# Patient Record
Sex: MALE | Race: BLACK OR AFRICAN AMERICAN | NOT HISPANIC OR LATINO | Employment: UNEMPLOYED | RURAL
[De-identification: names, ages, dates, MRNs, and addresses within clinical notes are randomized per-mention and may not be internally consistent; named-entity substitution may affect disease eponyms.]

---

## 2024-04-24 ENCOUNTER — OFFICE VISIT (OUTPATIENT)
Dept: FAMILY MEDICINE | Facility: CLINIC | Age: 44
End: 2024-04-24
Payer: COMMERCIAL

## 2024-04-24 VITALS
BODY MASS INDEX: 32.6 KG/M2 | SYSTOLIC BLOOD PRESSURE: 134 MMHG | RESPIRATION RATE: 18 BRPM | DIASTOLIC BLOOD PRESSURE: 89 MMHG | OXYGEN SATURATION: 98 % | WEIGHT: 246 LBS | TEMPERATURE: 99 F | HEIGHT: 73 IN | HEART RATE: 89 BPM

## 2024-04-24 DIAGNOSIS — R09.81 NASAL CONGESTION: ICD-10-CM

## 2024-04-24 DIAGNOSIS — J02.9 SORE THROAT: ICD-10-CM

## 2024-04-24 DIAGNOSIS — U07.1 COVID-19: ICD-10-CM

## 2024-04-24 DIAGNOSIS — R05.9 COUGH, UNSPECIFIED TYPE: Primary | ICD-10-CM

## 2024-04-24 LAB
CTP QC/QA: YES
MOLECULAR STREP A: NEGATIVE
POC MOLECULAR INFLUENZA A AGN: NEGATIVE
POC MOLECULAR INFLUENZA B AGN: NEGATIVE
SARS-COV-2 RDRP RESP QL NAA+PROBE: POSITIVE

## 2024-04-24 PROCEDURE — 99203 OFFICE O/P NEW LOW 30 MIN: CPT | Mod: GC,,, | Performed by: SPECIALIST

## 2024-04-24 PROCEDURE — 3075F SYST BP GE 130 - 139MM HG: CPT | Mod: ,,, | Performed by: SPECIALIST

## 2024-04-24 PROCEDURE — 1159F MED LIST DOCD IN RCRD: CPT | Mod: ,,, | Performed by: SPECIALIST

## 2024-04-24 PROCEDURE — 3079F DIAST BP 80-89 MM HG: CPT | Mod: ,,, | Performed by: SPECIALIST

## 2024-04-24 PROCEDURE — 87502 INFLUENZA DNA AMP PROBE: CPT | Mod: QW,,,

## 2024-04-24 PROCEDURE — 87635 SARS-COV-2 COVID-19 AMP PRB: CPT | Mod: QW,GC,, | Performed by: SPECIALIST

## 2024-04-24 PROCEDURE — 3008F BODY MASS INDEX DOCD: CPT | Mod: ,,, | Performed by: SPECIALIST

## 2024-04-24 PROCEDURE — 87651 STREP A DNA AMP PROBE: CPT | Mod: QW,,,

## 2024-04-24 NOTE — LETTER
April 24, 2024      [facility]  [address]  [place] [address]  Phone: [phone]  Fax: [phone]       Patient: Miriam Riley   YOB: 1980  Date of Visit: 04/24/2024    To Whom It May Concern:    Alejandro Riley  was at [facility] on 04/24/2024. The patient may return to work/school on 4/29/24 with no restrictions. If you have any questions or concerns, or if I can be of further assistance, please do not hesitate to contact me.    Sincerely,    Ozzie Inman MD

## 2024-04-24 NOTE — ASSESSMENT & PLAN NOTE
Patient with 4 days history of cough, fever, nasal congestion and body aches. Tested positive for Covid today at the clinic. Out of the window for treatment with Paxlovid. Symptomatic treatment recommended. Return or go to ER if no improvement or if gets worse.     - monitor for symptoms like Fever, chills, worsening of the current symptoms, or persistence of symptoms for more than 5 days.  - watch out for danger signs as inability to swallow, drooling, SOB, Apnea, or chest tightness. If these symptoms develop, go to ER  - Patient was tested for Flu, and Strep A, and swabs were negative.  - patient was counseled to do symptomatic treatment.  - plentiful rest with 5 days off from work  - maintain adequate hydration  - intake of warm/hot fluids  - Tylenol or ibuprofen for fever, pain  - follow up if symptoms fail to improve or worsen

## 2024-04-24 NOTE — PROGRESS NOTES
Subjective:       Patient ID: Miriam Riley is a 43 y.o. male.    Chief Complaint: Cough (Productive cough with yellow mucus since Saturday.), Sore Throat, Nasal Congestion (Sinus congestion and headache since Saturday.), Generalized Body Aches, Fever, and Health Maintenance (Care gaps not addressed, patient ill today.//Shanna Celis CMA)    43 year old male comes to the clinic today for a sick visit. States 4 days history of cough, fever, nasal congestion and body aches. Tested positive for Covid today at the clinic. Out of the window for treatment with Paxlovid. Symptomatic treatment recommended. Return or go to ER if no improvement or if gets worse. Monitor for symptoms like Fever, chills, worsening of the current symptoms, or persistence of symptoms for more than 5 days. Patient was tested for Flu, and Strep A, and swabs were negative. Patient was counseled to do symptomatic treatment, maintain adequate hydration, intake of warm/hot fluids, Tylenol or ibuprofen for fever, pain.        No current outpatient medications on file.    Review of patient's allergies indicates:  No Known Allergies    No past medical history on file.    No past surgical history on file.    No family history on file.    Social History     Tobacco Use    Smoking status: Never    Smokeless tobacco: Never   Substance Use Topics    Alcohol use: Not Currently    Drug use: Never       Review of Systems   Constitutional:  Positive for chills and fever. Negative for appetite change and fatigue.   HENT:  Positive for nasal congestion and sore throat. Negative for ear pain.    Eyes:  Negative for visual disturbance.   Respiratory:  Positive for cough. Negative for chest tightness and shortness of breath.    Cardiovascular:  Negative for chest pain and leg swelling.   Gastrointestinal:  Negative for abdominal pain, diarrhea, nausea and vomiting.   Genitourinary:  Negative for difficulty urinating and flank pain.   Musculoskeletal:  Negative for  "back pain and gait problem.   Integumentary:  Negative for rash and wound.   Neurological:  Negative for dizziness, syncope, light-headedness and headaches.   Psychiatric/Behavioral:  Negative for agitation and confusion.            Objective:      Vitals:    04/24/24 1346   BP: 134/89   BP Location: Left arm   Patient Position: Sitting   BP Method: Large (Automatic)   Pulse: 89   Resp: 18   Temp: 98.9 °F (37.2 °C)   TempSrc: Oral   SpO2: 98%   Weight: 111.6 kg (246 lb)   Height: 6' 1" (1.854 m)     Physical Exam  Vitals reviewed.   Constitutional:       General: He is not in acute distress.     Appearance: Normal appearance.   HENT:      Head: Normocephalic and atraumatic.      Right Ear: External ear normal.      Left Ear: External ear normal.      Nose: Congestion present.      Mouth/Throat:      Mouth: Mucous membranes are moist.      Pharynx: Posterior oropharyngeal erythema present.   Eyes:      Conjunctiva/sclera: Conjunctivae normal.      Pupils: Pupils are equal, round, and reactive to light.   Cardiovascular:      Rate and Rhythm: Normal rate and regular rhythm.      Pulses: Normal pulses.      Heart sounds: Normal heart sounds.   Pulmonary:      Effort: Pulmonary effort is normal. No respiratory distress.      Breath sounds: Normal breath sounds.   Abdominal:      General: Bowel sounds are normal.      Tenderness: There is no abdominal tenderness.   Musculoskeletal:         General: No swelling. Normal range of motion.      Cervical back: Normal range of motion.   Skin:     General: Skin is warm.      Coloration: Skin is not jaundiced.   Neurological:      General: No focal deficit present.      Mental Status: He is alert and oriented to person, place, and time.   Psychiatric:         Mood and Affect: Mood normal.         Behavior: Behavior normal.         Thought Content: Thought content normal.         Judgment: Judgment normal.         No results found for: "WBC", "HGB", "HCT", "PLT", "CHOL", "TRIG", " ""HDL", "LDLDIRECT", "ALT", "AST", "NA", "K", "CL", "CREATININE", "BUN", "CO2", "TSH", "PSA", "INR", "GLUF", "HGBA1C", "MICROALBUR"   Assessment:       1. Cough, unspecified type    2. Nasal congestion    3. Sore throat    4. COVID-19        Plan:         Problem List Items Addressed This Visit          ID    COVID-19     Patient with 4 days history of cough, fever, nasal congestion and body aches. Tested positive for Covid today at the clinic. Out of the window for treatment with Paxlovid. Symptomatic treatment recommended. Return or go to ER if no improvement or if gets worse.     - monitor for symptoms like Fever, chills, worsening of the current symptoms, or persistence of symptoms for more than 5 days.  - watch out for danger signs as inability to swallow, drooling, SOB, Apnea, or chest tightness. If these symptoms develop, go to ER  - Patient was tested for Flu, and Strep A, and swabs were negative.  - patient was counseled to do symptomatic treatment.  - plentiful rest with 5 days off from work  - maintain adequate hydration  - intake of warm/hot fluids  - Tylenol or ibuprofen for fever, pain  - follow up if symptoms fail to improve or worsen            Other Visit Diagnoses       Cough, unspecified type    -  Primary    Relevant Orders    POCT COVID-19 Rapid Screening (Completed)    POCT Strep A, Molecular (Completed)    POCT Influenza A/B Molecular (Completed)    Nasal congestion        Relevant Orders    POCT COVID-19 Rapid Screening (Completed)    POCT Strep A, Molecular (Completed)    POCT Influenza A/B Molecular (Completed)    Sore throat        Relevant Orders    POCT COVID-19 Rapid Screening (Completed)    POCT Strep A, Molecular (Completed)    POCT Influenza A/B Molecular (Completed)              Follow up if symptoms worsen or fail to improve.    Ozzie Inman MD     Instructed patient that if symptoms fail to improve or worsen patient should seek immediate medical attention or report to the nearest " emergency department. Patient expressed verbal agreement and understanding to this plan of care.